# Patient Record
Sex: MALE | Race: BLACK OR AFRICAN AMERICAN
[De-identification: names, ages, dates, MRNs, and addresses within clinical notes are randomized per-mention and may not be internally consistent; named-entity substitution may affect disease eponyms.]

---

## 2017-03-26 ENCOUNTER — HOSPITAL ENCOUNTER (EMERGENCY)
Dept: HOSPITAL 24 - ER | Age: 47
Discharge: HOSPICE | End: 2017-03-26
Payer: SELF-PAY

## 2017-03-26 VITALS — SYSTOLIC BLOOD PRESSURE: 137 MMHG | DIASTOLIC BLOOD PRESSURE: 102 MMHG

## 2017-03-26 VITALS — BODY MASS INDEX: 41.5 KG/M2

## 2017-03-26 DIAGNOSIS — M54.89: Primary | ICD-10-CM

## 2017-03-26 PROCEDURE — 99283 EMERGENCY DEPT VISIT LOW MDM: CPT

## 2017-03-26 PROCEDURE — 72100 X-RAY EXAM L-S SPINE 2/3 VWS: CPT

## 2017-03-26 PROCEDURE — 99282 EMERGENCY DEPT VISIT SF MDM: CPT

## 2017-03-26 PROCEDURE — 96372 THER/PROPH/DIAG INJ SC/IM: CPT

## 2017-03-26 NOTE — RAD
HISTORY:  Low back pain extending into right lower extremity



Study: Three-view lumbar spine



Comparison: None 



Findings:



Vertebral alignment is normal. There is no spondylolisthesis. There is chronic mild anterior wedging
 of the T10 and T11 vertebral bodies. Mild to moderate loss of disc space height is evident at the L
5-S1 level with associated degenerative endplate change. There is also moderate loss of disc space h
eight at the T11-T12 level with associated degenerative endplate changes well. Facet hypertrophy is 
noted within the lower lumbar spine.



IMPRESSION:

 

1. Multilevel lumbar spondylosis and facet hypertrophy as above



Reported By:Electronically Signed by JUNE TAPIA MD at 3/26/2017 3:25:05 PM

## 2017-03-26 NOTE — DR.EXTPAIN
HPI





- Time seen


Time seen: 13:36





- PCP


Primary Care Physician: FELY





- Complaint/Symptoms


Chief Complaint:: PT C/O THAT SINCE YESTERDAY MORNING  HE STARTED HAVING BACK 

PAIN THAT RADIATES TO HIS RIGHT LEG ....





- Source


History Provided: Patient





- Mode of arrival


Mode of Arrival: Wheelchair





- Timing


Onset of Chief Complaint: 03/25/17





PMH





- PMH


Past Medical History: No


Past Medical History: Diabetes


Past Surgical History: Yes


Surgical History: Cholecystectomy


Past Surgical History Comment: LEFT SHOULDER.





- Family History


History of Family Medical Conditions: Yes


Family Medical History: Diabetes Mellitus, Hypertension





- Social History


Does patient currently use any type of tobacco product: No


Have you used tobacco products in the last 12 months: No


Type of Tobacco Use: None


Does any household member use tobacco: No


Alcohol Use: None


Do you use any recreational Drugs:: No


Lives With: Alone


Lives Where: Home





- infectious screening


In the last 2 months have you had wt loss of >10#?: NO


Have you had fever, night sweats or hemotysis?: No


Have you traveled outside the country in the last 6 months?: No


Isolation: Standard





ROS





- Review of Systems


Constitutional: No Symptoms Reported


Eyes: No Symptoms Reported


ENTM: No Symptoms Reported


Respiratoy: No Symptoms Reported


Cardiovascular: No Symptoms Reported


Gastrointestinal/Abdominal: No Symptoms Reported


Genitourinary: No Symptoms Reported


Neurological: Numbness (right leg)


Musculoskeletal: Back Pain


Integumentary: No Symptoms Reported


Hematologic/Lymphatic: No Symptoms Reported


Endocrine: No Symptoms Reported, Increased Hunger


All Other Systems: Reviewed and Negative





PE





- Vital Signs


Vitals: 


 





Temperature                      98.5 F


Pulse Rate                       104


Respiratory Rate                 20


Blood Pressure [Left Arm]        127/83


Blood Pressure                   137/102


O2 Sat by Pulse Oximetry         98











- General


Limitations: No Limitations


General Appearance: Alert, In No Apparent Distress





- Head


Head Exam: Normal Inspection, Atraumatic





- Eyes


Eye exam: Normal Appearance, PERRL, EOMI





- ENT


ENT Exam: Normal Exam





- Neck


Neck Exam: Normal Inspection, Full ROM, Trachea Midline





- Cardiovascular


Cardiovascular Exam: Regular Rate





- Abdominal Exam


Abdominal Exam: Normal Inspection, Normal Bowel Sounds


Abdominal Tenderness: negative: RUQ, RLQ, LUQ, LLQ, Epigastrium, Suprapubic, 

Diffuse, Mild, Moderate, Severe, Other





- Extremities


Extremities Exam: Normal Inspection, Full ROM





- Upper Extremities


Shoulder Exam: Normal Inspection


Arm Exam: Normal Inspection, Full ROM


Elbow Exam: Normal Inspection


Forearm Exam: Normal Inspection


Hand Exam: Normal Inspection


Neuromotor Exam: Normal Exam, Wrist Extension


Neurosensory Exam: Normal Exam


Hand Tendon Exam: Flexor Digitorium Profundus (Location)


Upper Ext. Vascular Exam: Capillary Refill





- Lower Extremities


Hip/Pelvis Exam: Normal Inspection


Upper Leg Exam: Normal Inspection


Knee Exam: Normal Inspection


Lower Leg Exam: Normal Inspection


Ankle Exam: Normal Inspection


Foot/Toe Exam: Normal Inspection


Neurovascular/Tendon Exam: Normal Capillary Refill


Gait Exam: Observed and Normal





- Back


Back Exam: Normal Inspection





- Neurological


Neurological Exam: Alert, Oriented X3, CN II-XII Intact





- Psychiatric


Psychiatric Exam: Normal Affect, Normal Mood





- Skin


Skin Exam: Warm, Dry, Intact


Type of Lesion: Rash


Distribution: Generalized


Description: Size





ROR





- XRAY


XRAY Interpreted by: Self (Lumbar: w/o pathology)





- Diagnosis


Discharge Problem: 


 Muscle spasm of back








- Discharge Plan


Condition: Stable





- Follow ups/Referrals


Follow ups/Referrals: 


[facility],None [Primary Care Provider] - 3 days





- Instructions

## 2017-12-02 ENCOUNTER — HOSPITAL ENCOUNTER (EMERGENCY)
Dept: HOSPITAL 24 - ER | Age: 47
Discharge: HOME | End: 2017-12-02
Payer: SELF-PAY

## 2017-12-02 VITALS — DIASTOLIC BLOOD PRESSURE: 70 MMHG | SYSTOLIC BLOOD PRESSURE: 130 MMHG

## 2017-12-02 VITALS — BODY MASS INDEX: 37.9 KG/M2

## 2017-12-02 DIAGNOSIS — I10: ICD-10-CM

## 2017-12-02 DIAGNOSIS — M19.90: ICD-10-CM

## 2017-12-02 DIAGNOSIS — E11.9: Primary | ICD-10-CM

## 2017-12-02 LAB
ALBUMIN SERPL BCP-MCNC: 3.9 G/DL (ref 3.4–5)
ALP SERPL-CCNC: 68 UNITS/L (ref 46–116)
ALT SERPL W P-5'-P-CCNC: 25 UNITS/L (ref 12–78)
AST SERPL W P-5'-P-CCNC: 16 UNITS/L (ref 15–37)
BACTERIA URNS QL MICRO: (no result) /HPF
BASOPHILS # BLD AUTO: 0.1 X10^3/UL (ref 0–0.1)
BASOPHILS NFR BLD AUTO: 1.4 % (ref 0.2–1)
BILIRUB UR QL STRIP.AUTO: NEGATIVE
BUN SERPL-MCNC: 6 MG/DL (ref 7–18)
CALCIUM ALBUM COR SERPL-SCNC: (no result) MG/DL (ref 8.5–10.1)
CALCIUM ALBUM COR SERPL-SCNC: 141 MMOL/L (ref 136–145)
CALCIUM SERPL-MCNC: 9.7 MG/DL (ref 8.5–10.1)
CHLORIDE SERPL-SCNC: 102 MMOL/L (ref 98–107)
CLARITY UR: (no result)
CO2 SERPL-SCNC: 26.5 MMOL/L (ref 21–32)
COLOR UR AUTO: YELLOW
CREAT SERPL-MCNC: 0.8 MG/DL (ref 0.7–1.3)
EGFR  BLACK RACES: > 60 (ref 60–?)
EOSINOPHIL # BLD AUTO: 0.1 X10^3/UL (ref 0–0.2)
EOSINOPHIL NFR BLD AUTO: 0.9 % (ref 0.9–2.9)
ERYTHROCYTE [DISTWIDTH] IN BLOOD BY AUTOMATED COUNT: 13.3 % (ref 11.6–16.5)
GLUCOSE UR QL STRIP.AUTO: (no result)
HCT VFR BLD AUTO: 45.6 % (ref 42–54)
HGB BLD-MCNC: 15.3 G/DL (ref 13.5–18)
KETONES UR QL STRIP.AUTO: NEGATIVE
LEUKOCYTE ESTERASE UR QL STRIP.AUTO: NEGATIVE
LYMPHOCYTES # BLD AUTO: 5.4 X10^3/UL (ref 1.3–2.9)
LYMPHOCYTES NFR BLD AUTO: 50.6 % (ref 21–51)
MCH RBC QN AUTO: 26.2 PG (ref 27–34)
MCHC RBC AUTO-ENTMCNC: 33.6 G/DL (ref 33–35)
MCV RBC AUTO: 78.1 FL (ref 80–100)
MONOCYTES # BLD AUTO: 0.7 X10^3/UL (ref 0.3–0.8)
MONOCYTES NFR BLD AUTO: 6.4 % (ref 0–13)
NEUTROPHILS # BLD AUTO: 4.4 X10^3/UL (ref 2.2–4.8)
NEUTROPHILS NFR BLD AUTO: 40.7 % (ref 42–75)
NITRITE UR QL STRIP.AUTO: NEGATIVE
PH UR STRIP.AUTO: 5 [PH] (ref 5–8)
PLATELET # BLD: 287 X10^3/UL (ref 150–450)
PMV BLD AUTO: 8 FL (ref 7.4–11)
PROT SERPL-MCNC: 8.5 G/DL (ref 6.4–8.2)
PROT UR QL STRIP.AUTO: (no result)
RBC # BLD AUTO: 5.83 X10^6/UL (ref 4.7–6)
RBC # UR STRIP.AUTO: NEGATIVE /UL
RBC #/AREA URNS HPF: (no result) /HPF
SODIUM SERPL-SCNC: 139 MMOL/L (ref 136–145)
SP GR UR STRIP.AUTO: 1.02 (ref 1–1.03)
SQUAMOUS URNS QL MICRO: (no result) /HPF
TRICHOMONAS UR QL MICRO: (no result) /HPF
UROBILINOGEN UR QL STRIP.AUTO: NORMAL
WBC NRBC COR # BLD AUTO: 10.7 X10^3/UL (ref 3.6–10)

## 2017-12-02 PROCEDURE — 81001 URINALYSIS AUTO W/SCOPE: CPT

## 2017-12-02 PROCEDURE — 99282 EMERGENCY DEPT VISIT SF MDM: CPT

## 2017-12-02 PROCEDURE — 36415 COLL VENOUS BLD VENIPUNCTURE: CPT

## 2017-12-02 PROCEDURE — 85025 COMPLETE CBC W/AUTO DIFF WBC: CPT

## 2017-12-02 PROCEDURE — 80053 COMPREHEN METABOLIC PANEL: CPT

## 2017-12-02 NOTE — DR.GENAD
HPI





- PCP


Primary Care Physician: NFD





- Complaint/Symptoms


Chief Complaint Doctors Comments: Patient states he was diagnosed with diabetes 

and hypertension and was taking medicines but has been out for a long time now 

is complaining of headache, cold, night sweats with knee pain for the past 

week.  states he has not had his glucose checked in two months.  States he has 

been having nocturia but denies hematuria.  States he smokes occasionally and 

drinks at times but denies drug usage.  He denies chest pain or SOB.  States he 

has been having nocturia but denies hematuria.


Chief Complaint:: PT C/O HAVING A HA, TIMES 2 WEEKS AGO AND PT HAS LEFT 

SHOULDER AND KNEE PAIN ".. PT DOES NOT KNOW WHAT MED HE TAKES FOR HIS BLOOD 

SUGAR,


Self Treatment fo Chief Complaint: NO





- Nurses notes reviewed


Nurses Notes Review: Yes





- Source


History Provided: Patient





- Mode of Arrival


Mode of Arrival: Ambulatory





- Timing


Onset of Chief Complaint: 17


Came on: Gradually





- Duration


Duration: Intermittent


How lon


Duration: Weeks





- Location


Location: knee pain





- Severity


Severity: Mild





- Modifying Factors


Worsens:: nothing


Improves:: nothing





PMH





- PMH


Past Medical History: Yes


Past Medical History: Diabetes, Hypertension


Past Surgical History: Yes


Surgical History: Cholecystectomy


Past Surgical History Comment: LEFT ROTATOR CUFF,





- Family History


History of Family Medical Conditions: Yes


Family Medical History: Diabetes Mellitus, Hypertension





- Social History


Does patient currently use any type of tobacco product: No


Have you used tobacco products in the last 12 months: No


Type of Tobacco Use: Cigarettes


How many years tobacco product used: 3


Does any household member use tobacco: No


Alcohol Use: None


Do you use any recreational Drugs:: No


Lives With: Alone


Lives Where: Home





- infectious screening


In the last 2 months have you had wt loss of >10#?: NO


Have you had fever, night sweats or hemotysis?: No


Have you traveled outside the country in the last 6 months?: No


Isolation: Standard





ROS





- Review of Systems


Constitutional: No Symptoms Reported, Loss of Appetite.  negative: See HPI, 

Chills, Diaphoresis, Fever, Malaise, Weakness, Irritable, Fatigue, Other


Eyes: No Symptoms Reported


ENTM: No Symptoms Reported, Nose Discharge, Nose Congestion.  negative: See HPI

, Ear Pain, Ear Discharge, Pulling on Ears, Hearing Loss, Nose Pain, Epistaxis, 

Mouth Pain, Mouth Swelling, Loose Teeth, Drooling, Throat Pain, Throat Swelling

, Ear Foreign Body


Respiratoy: No Symptoms Reported, Non-Productive Cough.  negative: See HPI, 

Productive Cough, Moist Cough, Dry Cough, Hacking Cough, Barking Cough, Brassy 

Cough, Orthopnea, Short of Breath, Stridor, Wheezing, Hemoptysis, Other


Cardiovascular: No Symptoms Reported.  negative: See HPI, Chest Pain, Edema, 

Palpitations, Syncope, Cyanosis, Skin Mottling, Other


Gastrointestinal/Abdominal: No Symptoms Reported.  negative: See HPI, Abdominal 

Pain, Constipation, Diarrhea, Nausea, Vomiting, Food Intolerance, Other


Genitourinary: No Symptoms Reported, Frequency.  negative: See HPI, Discharge, 

Dysuria, Hematuria, Pain, Bleeding, Other


Neurological: No Symptoms Reported.  negative: See HPI, Anxiety, Depressed, 

Emotional Problems, Headache, Numbness, Paresthesia, Pre-existing Deficit, 

Seizure, Tingling, Tremors, Weakness, Dizziness, Problems Walking, Speech 

Problem, Other


Musculoskeletal: No Symptoms Reported


Integumentary: No Symptoms Reported


Hematologic/Lymphatic: No Symptoms Reported


Endocrine: No Symptoms Reported.  negative: See HPI, Excessive Sweating, 

Flushing, Intolerance to Cold, Intolerance to Heat, Increased Hunger, Increased 

Thirst, Increased Urine, Unexplained Weight Gain, Unexplained Weight Loss, 

Failure to Thrive, Decreased Appetite, Other


Psychiatric: No Symptoms Reported.  negative: See HPI, Anxiety, Depression, 

Hallucinations, Excessive crying, Suicidal, Other





PE





- Vital Signs


Vitals: 


 





Temperature                      97.2 F


Pulse Rate                       109


Respiratory Rate                 18


Blood Pressure [Left Arm]        127/83


Blood Pressure                   173/101


O2 Sat by Pulse Oximetry         96











- General


Limitations: No Limitations


General Appearance: Alert, In No Apparent Distress.  negative: Appears 

Intoxicated, Anxious, Lethargic, Obtunded, In Distress, Obese, Cachectic, Other





- Head


Head Exam: Normal Inspection, Atraumatic, Normocephalic





- Eyes


Eye exam: Normal Appearance, PERRL, EOMI.  negative: Scleral Icterus, 

Conjunctival Injection, Nystagmus, Miosis, Mydrasis, Periorbital Swelling, 

Periorbital Tenderness, Other





- ENT


ENT Exam: Normal Exam, Normal Oropharynx, Normal  External Ear Exam, Mucous 

Membranes Moist, TM's Normal Bilaterally


External Ear Exam: Normal External Inspection


TM/Canal Exam: Bilateral Normal


Nose Exam: Normal Nose Exam


Mouth Exam: Normal Inspection


Throat Exam: Normal Inspection.  negative: Tonsillar Erythema, Tonsillomegaly, 

Tonsillar Exudate, R Peritonsillar Mass, L Peritonsillar Mass, Muffled Voice, 

Other





- Neck


Neck Exam: Normal Inspection, Full ROM, Trachea Midline





- Chest


Chest Inspection: Normal Inspection, Symmetric Chest Wall Rise





- Respiratory


Respiratory Exam: Normal Lung Sounds Bilat


Respiratory Exam: Bilateral Clear to Auscultation





- Cardiovascular


Cardiovascular Exam: Regular Rate, Normal Rhythm, Normal Heart Sounds.  negative

: Bradycardia, Tachycardia, Irregular Rhythm, Systolic Murmur, Diastolic Murmur

, Rubs, Gallop, Clicks, JVD, +S1, +S2, +S3, +S4, Other





- Abdominal Exam


Abdominal Exam: Normal Inspection, Normal Bowel Sounds, Soft.  negative: 

Distention, Tenderness, Guarding, Rebound, Rigidity, Dimnished Bowel Sounds, 

Hyperactive Bowel Sounds, Hypoactive Bowel Sounds, Organomegaly, Trauma, 

Incision, Ascites, Mass, Bruit, Pulsatile Mass, Hernia, Other


Abdominal Tenderness: negative: RUQ, RLQ, LUQ, LLQ, Epigastrium, Suprapubic, 

Diffuse, Mild, Moderate, Severe, Other





- Extremities


Extremities Exam: Normal Inspection, Full ROM, Normal Capillary Refill.  

negative: Tenderness, Edema, Joint Swelling, Calf Tenderness, Other





- Back


Back Exam: Normal Inspection, Full ROM.  negative: Tenderness, (R) CVA 

Tenderness, (L) CVA Tenderness, Muscle Spasm, Paraspinal Tenderness, Vertebral 

Tenderness, Rashes, (R) Sciatic Notch Tenderness, (L) Sciatic Notch Tendern, (R

) Straight Leg Raise, (L) Straight Leg Raise, Other





- Neurologic


Neurological Exam: Alert, Oriented X3, CN II-XII Intact, Normal Gait, Reflexes 

Normal





- Psychiatric


Psychiatric Exam: Normal Affect, Normal Mood.  negative: Depressed, Agitated, 

Anxious, Flat Affect, Manic, Homicidal Ideation, Suicidal Ideation, Other





- Skin


Skin Exam: Warm, Dry, Intact, Normal Color





ROR





- Labs Reviewed


Laboratory Results Reviewed?: Yes (all labs and x-ray results reviewed and 

discussed with patient)


Result Diagrams: 


 17 16:50





 17 16:50


Laboratory: 


 











WBC  10.7 X10^3/uL (3.6-10.0)  H  17  16:50    


 


RBC  5.83 X10^6/uL (4.7-6.0)   17  16:50    


 


Hgb  15.3 g/dL (13.5-18.0)   17  16:50    


 


Hct  45.6 % (42.0-54.0)   17  16:50    


 


MCV  78.1 fL (80.0-100.0)  L  17  16:50    


 


MCH  26.2 pg (27.0-34.0)  L  17  16:50    


 


MCHC  33.6 g/dL (33.0-35.0)   17  16:50    


 


RDW  13.3 % (11.6-16.5)   17  16:50    


 


Plt Count  287 X10^3/uL (150.0-450.0)   17  16:50    


 


MPV  8.0 fL (7.4-11.0)   17  16:50    


 


Neut %  40.7 % (42.0-75.0)  L  17  16:50    


 


Lymph %  50.6 % (21.0-51.0)   17  16:50    


 


Mono %  6.4 % (0.0-13.0)   17  16:50    


 


Eos %  0.9 % (0.9-2.9)   17  16:50    


 


Baso %  1.4 % (0.2-1.0)  H  17  16:50    


 


Neut #  4.4 x10^3/uL (2.2-4.8)   17  16:50    


 


Lymph #  5.4 X10^3/uL (1.3-2.9)  H  17  16:50    


 


Mono #  0.7 x10^3/uL (0.3-0.8)   17  16:50    


 


Eos #  0.1 x10^3/uL (0.0-0.2)   17  16:50    


 


Baso #  0.1 X10^3/uL (0.0-0.1)   17  16:50    


 


Absolute Nucleated RBC  0.0 /100WBC  17  16:50    


 


Sodium  139 mmol/L (136-145)   17  16:50    


 


Corrected Sodium  141 mmol/L (136-145)   17  16:50    


 


Potassium  3.7 mmol/L (3.5-5.1)   17  16:50    


 


Chloride  102 mmol/L ()   17  16:50    


 


Carbon Dioxide  26.5 mmol/L (21-32)   17  16:50    


 


BUN  6 mg/dL (7-18)  L  17  16:50    


 


Creatinine  0.80 mg/dL (0.70-1.30)   17  16:50    


 


Est GFR (MDRD) Af Amer  > 60  (>60)   17  16:50    


 


Est GFR (MDRD) Non-Af  > 60  (>60)   17  16:50    


 


Glucose  168 mg/dL (65-99)  H  17  16:50    


 


Calcium  9.7 mg/dL (8.5-10.1)   17  16:50    


 


Corrected Calcium  TNP   17  16:50    


 


Total Bilirubin  0.40 mg/dL (0.2-1.0)   17  16:50    


 


AST  16 Units/L (15-37)   17  16:50    


 


ALT  25 Units/L (12-78)   17  16:50    


 


Alkaline Phosphatase  68 Units/L ()   17  16:50    


 


Total Protein  8.5 g/dL (6.4-8.2)  H  17  16:50    


 


Albumin  3.9 g/dL (3.4-5.0)   17  16:50    


 


Globulin  4.6 g/dL (2.5-4.5)  H  17  16:50    


 


Albumin/Globulin Ratio  0.8 Ratio (1.1-2.1)  L  17  16:50    














- Diagnosis


Discharge Problem: 


 Essential hypertension, Degenerative arthritis of knee





Diabetes mellitus


Qualifiers:


 Diabetes mellitus type: type 2 








- Discharge Plan


Disposition:  HOME, SELF-CARE


Condition: Stable


Prescriptions: 


Ibuprofen [MOTRIN  MG *] 800 mg PO BID PRN #60 tab


 PRN Reason: Pain/Inflammation


Lisinopril & Hydrochlorothiazi [ZESTORETIC 10/12.5 MG *] 1 tab PO DAILY #30 tab





- Follow ups/Referrals


Follow ups/Referrals: 


[facility],None [Primary Care Provider] - 3 days


KIMBERLEY HYMAN [STAFF PHYSICIAN] - 3 days





- Instructions


Instructions:  Hyperglycemia, Easy-to-Read, Type 2 Diabetes Mellitus, Adult, 

Easy-to-Read, Blood Glucose Monitoring, Adult, Hypertension, Easy-to-Read, DASH 

Eating Plan

## 2018-01-29 ENCOUNTER — HOSPITAL ENCOUNTER (EMERGENCY)
Dept: HOSPITAL 24 - ER | Age: 48
Discharge: HOME | End: 2018-01-29
Payer: SELF-PAY

## 2018-01-29 VITALS — SYSTOLIC BLOOD PRESSURE: 133 MMHG | DIASTOLIC BLOOD PRESSURE: 81 MMHG

## 2018-01-29 VITALS — BODY MASS INDEX: 0.4 KG/M2

## 2018-01-29 DIAGNOSIS — J11.1: Primary | ICD-10-CM

## 2018-01-29 DIAGNOSIS — J40: ICD-10-CM

## 2018-01-29 PROCEDURE — 87502 INFLUENZA DNA AMP PROBE: CPT

## 2018-01-29 PROCEDURE — 99282 EMERGENCY DEPT VISIT SF MDM: CPT

## 2018-01-29 NOTE — DR.URIAD
HPI





- Time Seen


Time seen: 13:00





- PCP


Primary Care Physician: FELY





- HPI Comment


HPI Comment: ALSO N/V/D THAT IS RESOLVING. WEAK TODAY.





- Complaint


Chief Complaint Doctors Comments: COUGH, COLD CONGESTION THAT IS GETTING WORSE 

WITH FEVER.


Chief Complaint:: PT STATES " I THINK I HAVE THE FLU".. I HAVE HAD SOME KIDS 

MOVED IN WITH ME AND THEY HAD THE FLU ,,, AND I HAVE THE COLD SWEATS, CCC, AND 

DOING NUMBER ONE AND 2 ALOT.


Self Treatment fo Chief Complaint: NO MEDS ,





- Source


History Provided: Patient





- Mode of Arrival


Mode of Arrival: Ambulatory





- Timing


Onset of Chief Complaint: 01/26/18





- Quality


Shortness of Breath: none





PMH





- PMH


Past Medical History: Yes


Past Medical History: Diabetes, Hypertension


Past Surgical History: Yes


Surgical History: Cholecystectomy





- Family History


History of Family Medical Conditions: Yes


Family Medical History: Diabetes Mellitus, Hypertension





- Social History


Does patient currently use any type of tobacco product: No


Have you used tobacco products in the last 12 months: No


Type of Tobacco Use: None


Does any household member use tobacco: No


Alcohol Use: Rarely


Do you use any recreational Drugs:: No


Lives With: Spouse


Lives Where: Home





- infectious screening


In the last 2 months have you had wt loss of >10#?: NO


Have you had fever, night sweats or hemotysis?: No


Have you traveled outside the country in the last 6 months?: No


Isolation: Standard





ROS





- Review of Systems


Constitutional: Fever, Weakness, Fatigue.  negative: Chills


Eyes: No Symptoms Reported


ENTM: Nose Discharge, Nose Congestion, Throat Pain.  negative: Ear Pain


Respiratoy: Wheezing, Hemoptysis.  negative: Short of Breath


Cardiovascular: No Symptoms Reported


Genitourinary: No Symptoms Reported


Neurological: Weakness


Musculoskeletal: Muscle Pain


Integumentary: Dryness


Hematologic/Lymphatic: No Symptoms Reported


Endocrine: No Symptoms Reported


All Other Systems: Reviewed and Negative





PE





- Vital Signs


Vitals: 


 





Temperature                      98.4 F


Pulse Rate                       121


Respiratory Rate                 20


Blood Pressure [Left Arm]        130/70


Blood Pressure                   133/81


O2 Sat by Pulse Oximetry         99











- General


Limitations: No Limitations


General Appearance: Alert





- Head


Head Exam: Normal Inspection





- Eyes


Eye exam: Normal Appearance





- ENT


ENT Exam: Normal  External Ear Exam


External Ear Exam: Normal External Inspection


TM/Canal Exam: Bilateral Normal


Nose Exam: Normal Nose Exam


Mouth Exam: Normal Inspection


Throat Exam: Normal Inspection





- Neck


Neck Exam: Trachea Midline





- Chest


Chest Inspection: Symmetric Chest Wall Rise





- Respiratory


Respiratory Exam: Normal Lung Sounds Bilat


Respiratory Exam: Bilateral Clear to Auscultation





- Cardiovascular


Cardiovascular Exam: Regular Rate, Normal Rhythm, Normal Heart Sounds





- Abdominal Exam


Abdominal Exam: Normal Inspection, Normal Bowel Sounds.  negative: Tenderness





- Extremeties


Extremities Exam: Normal Inspection





- Back


Back Exam: Normal Inspection





- Neurologic


Neurological Exam: Alert, Oriented X3





- Psychiatric


Psychiatric Exam: Normal Affect, Normal Mood





- Skin


Skin Exam: Normal Color





MDM





- Differential Diagnosis


Differential Diagnosis: Influenza A, Influenza B, Otitis media, Streptococcal 

pharyngitis, Viral pharyngitis, Pneumonia, Sinsusitis, URI





Course





- Treatment


Treatment: SEE ORDERS.





- Education/Counseling


Education/Counseling: Patient, Education


Educated On: Diagnosis, Needs for Follow Up





ROR





- Labs Reviewed


Laboratory Results Reviewed?: Yes


Laboratory: 


 











Influenza Type A (PCR)  Positive  (NEGATIVE)  A  01/29/18  12:17    


 


Influenza Type B (PCR)  Negative  (NEGATIVE)   01/29/18  12:17    














- Diagnosis


Discharge Problem: 


 Influenza, Bronchitis








- Discharge Plan


Disposition: 01 HOME, SELF-CARE


Condition: Stable


Prescriptions: 


Acetaminophen with Codeine [Tylenol/Codeine #3 300-30 mg] 1 tab PO Q6H PRN #15 

tab


 PRN Reason: Pain


Amoxicillin [Amoxil 875 mg] 875 mg PO Q12H #20 tab


Oseltamivir Phosphate [Tamiflu] 75 mg PO BID #10 cap





- Follow ups/Referrals


Follow ups/Referrals: 


[facility],None [Primary Care Provider] - 3 days





- Instructions


Instructions:  Influenza, Adult, Easy-to-Read, Acute Bronchitis, Easy-to-Read


Additional Instructions: 


RETURN TO ED IF WORSE.